# Patient Record
Sex: FEMALE | Race: WHITE | NOT HISPANIC OR LATINO | URBAN - NONMETROPOLITAN AREA
[De-identification: names, ages, dates, MRNs, and addresses within clinical notes are randomized per-mention and may not be internally consistent; named-entity substitution may affect disease eponyms.]

---

## 2017-12-11 ENCOUNTER — APPOINTMENT (RX ONLY)
Dept: URBAN - NONMETROPOLITAN AREA CLINIC 22 | Facility: CLINIC | Age: 42
Setting detail: DERMATOLOGY
End: 2017-12-11

## 2017-12-11 VITALS — HEIGHT: 65 IN | WEIGHT: 220 LBS

## 2017-12-11 DIAGNOSIS — L82.1 OTHER SEBORRHEIC KERATOSIS: ICD-10-CM

## 2017-12-11 DIAGNOSIS — D18.0 HEMANGIOMA: ICD-10-CM

## 2017-12-11 DIAGNOSIS — D22 MELANOCYTIC NEVI: ICD-10-CM
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
## 2017-12-11 DIAGNOSIS — L57.8 OTHER SKIN CHANGES DUE TO CHRONIC EXPOSURE TO NONIONIZING RADIATION: ICD-10-CM

## 2017-12-11 DIAGNOSIS — Z71.89 OTHER SPECIFIED COUNSELING: ICD-10-CM
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
## 2017-12-11 PROBLEM — F32.9 MAJOR DEPRESSIVE DISORDER, SINGLE EPISODE, UNSPECIFIED: Status: ACTIVE | Noted: 2017-12-11

## 2017-12-11 PROBLEM — D22.5 MELANOCYTIC NEVI OF TRUNK: Status: ACTIVE | Noted: 2017-12-11

## 2017-12-11 PROBLEM — D18.01 HEMANGIOMA OF SKIN AND SUBCUTANEOUS TISSUE: Status: ACTIVE | Noted: 2017-12-11

## 2017-12-11 PROCEDURE — 99203 OFFICE O/P NEW LOW 30 MIN: CPT

## 2017-12-11 PROCEDURE — ? COUNSELING

## 2017-12-11 ASSESSMENT — LOCATION DETAILED DESCRIPTION DERM
LOCATION DETAILED: LEFT CENTRAL LATERAL NECK
LOCATION DETAILED: LEFT POSTERIOR SHOULDER
LOCATION DETAILED: PERIUMBILICAL SKIN
LOCATION DETAILED: RIGHT POSTERIOR SHOULDER
LOCATION DETAILED: SUPERIOR LUMBAR SPINE
LOCATION DETAILED: MID TRAPEZIAL NECK
LOCATION DETAILED: RIGHT CENTRAL LATERAL NECK
LOCATION DETAILED: LEFT PROXIMAL DORSAL FOREARM
LOCATION DETAILED: RIGHT PROXIMAL DORSAL FOREARM
LOCATION DETAILED: INFERIOR THORACIC SPINE
LOCATION DETAILED: SUPERIOR MID FOREHEAD

## 2017-12-11 ASSESSMENT — LOCATION SIMPLE DESCRIPTION DERM
LOCATION SIMPLE: NECK
LOCATION SIMPLE: ABDOMEN
LOCATION SIMPLE: LEFT FOREARM
LOCATION SIMPLE: SUPERIOR FOREHEAD
LOCATION SIMPLE: LOWER BACK
LOCATION SIMPLE: UPPER BACK
LOCATION SIMPLE: RIGHT SHOULDER
LOCATION SIMPLE: LEFT SHOULDER
LOCATION SIMPLE: TRAPEZIAL NECK
LOCATION SIMPLE: RIGHT FOREARM

## 2017-12-11 ASSESSMENT — LOCATION ZONE DERM
LOCATION ZONE: FACE
LOCATION ZONE: TRUNK
LOCATION ZONE: NECK
LOCATION ZONE: ARM

## 2017-12-11 NOTE — HPI: FULL BODY SKIN EXAMINATION
What Is The Reason For Today's Visit?: Full Body Skin Examination
What Is The Reason For Today's Visit? (Being Monitored For X): concerning skin lesions on an annual basis
Additional History: Pt states she has a variety of moles she would like looked at.

## 2018-12-12 ENCOUNTER — APPOINTMENT (RX ONLY)
Dept: URBAN - NONMETROPOLITAN AREA CLINIC 22 | Facility: CLINIC | Age: 43
Setting detail: DERMATOLOGY
End: 2018-12-12

## 2018-12-12 VITALS — HEIGHT: 65 IN | WEIGHT: 210 LBS

## 2018-12-12 DIAGNOSIS — D22 MELANOCYTIC NEVI: ICD-10-CM

## 2018-12-12 DIAGNOSIS — D18.0 HEMANGIOMA: ICD-10-CM

## 2018-12-12 DIAGNOSIS — Z71.89 OTHER SPECIFIED COUNSELING: ICD-10-CM

## 2018-12-12 DIAGNOSIS — L20.89 OTHER ATOPIC DERMATITIS: ICD-10-CM

## 2018-12-12 DIAGNOSIS — L82.1 OTHER SEBORRHEIC KERATOSIS: ICD-10-CM

## 2018-12-12 DIAGNOSIS — L57.8 OTHER SKIN CHANGES DUE TO CHRONIC EXPOSURE TO NONIONIZING RADIATION: ICD-10-CM

## 2018-12-12 PROBLEM — L20.84 INTRINSIC (ALLERGIC) ECZEMA: Status: ACTIVE | Noted: 2018-12-12

## 2018-12-12 PROBLEM — D18.01 HEMANGIOMA OF SKIN AND SUBCUTANEOUS TISSUE: Status: ACTIVE | Noted: 2018-12-12

## 2018-12-12 PROBLEM — D22.5 MELANOCYTIC NEVI OF TRUNK: Status: ACTIVE | Noted: 2018-12-12

## 2018-12-12 PROCEDURE — ? PRESCRIPTION

## 2018-12-12 PROCEDURE — 99214 OFFICE O/P EST MOD 30 MIN: CPT

## 2018-12-12 PROCEDURE — ? COUNSELING

## 2018-12-12 PROCEDURE — ? TREATMENT REGIMEN

## 2018-12-12 RX ORDER — TRIAMCINOLONE ACETONIDE 1 MG/G
OINTMENT TOPICAL
Qty: 1 | Refills: 1 | Status: ERX | COMMUNITY
Start: 2018-12-12

## 2018-12-12 RX ADMIN — TRIAMCINOLONE ACETONIDE: 1 OINTMENT TOPICAL at 18:51

## 2018-12-12 ASSESSMENT — LOCATION DETAILED DESCRIPTION DERM
LOCATION DETAILED: LEFT SUPERIOR LATERAL LOWER BACK
LOCATION DETAILED: PERIUMBILICAL SKIN
LOCATION DETAILED: INFERIOR THORACIC SPINE
LOCATION DETAILED: SUPERIOR LUMBAR SPINE

## 2018-12-12 ASSESSMENT — LOCATION ZONE DERM: LOCATION ZONE: TRUNK

## 2018-12-12 ASSESSMENT — LOCATION SIMPLE DESCRIPTION DERM
LOCATION SIMPLE: LOWER BACK
LOCATION SIMPLE: UPPER BACK
LOCATION SIMPLE: ABDOMEN
LOCATION SIMPLE: LEFT LOWER BACK

## 2018-12-12 NOTE — PROCEDURE: TREATMENT REGIMEN
Initiate Treatment: Triamcinolone ointment: Apply to rash BID X 2 weeks max, then take week off and repeat PRN for flares
Detail Level: Zone

## 2018-12-12 NOTE — HPI: FULL BODY SKIN EXAMINATION
What Type Of Note Output Would You Prefer (Optional)?: Standard Output
What Is The Reason For Today's Visit?: Full Body Skin Examination
What Is The Reason For Today's Visit? (Being Monitored For X): concerning skin lesions on an annual basis
Additional History: Pt is here for CSE.